# Patient Record
Sex: MALE | Race: WHITE | NOT HISPANIC OR LATINO | Employment: UNEMPLOYED | URBAN - METROPOLITAN AREA
[De-identification: names, ages, dates, MRNs, and addresses within clinical notes are randomized per-mention and may not be internally consistent; named-entity substitution may affect disease eponyms.]

---

## 2019-08-20 ENCOUNTER — TELEPHONE (OUTPATIENT)
Dept: NEUROLOGY | Facility: CLINIC | Age: 29
End: 2019-08-20

## 2019-08-20 NOTE — TELEPHONE ENCOUNTER
----- Message from Franky Neal sent at 8/20/2019  2:21 PM CDT -----  Contact: Ghadakristy [facility] [phone]  Type: Needs Medical Advice    Who Called:  Ghada [facility] [phone]    Best call back number for ptnt [phone]    Additional Information:     Advised F/U up on a referral faxed to the office on 08-14-19 for this ptnt. The ptnt has not heard from anyone regarding scheduling an appmnt. Please call the ptnt to schedule.  Please call Ghada to confirm when ptnt has been scheduled.

## 2020-10-20 DIAGNOSIS — R29.90 STROKE-LIKE SYMPTOMS: Primary | ICD-10-CM
